# Patient Record
Sex: MALE | Race: WHITE | NOT HISPANIC OR LATINO | Employment: FULL TIME | URBAN - METROPOLITAN AREA
[De-identification: names, ages, dates, MRNs, and addresses within clinical notes are randomized per-mention and may not be internally consistent; named-entity substitution may affect disease eponyms.]

---

## 2018-04-15 ENCOUNTER — OFFICE VISIT (OUTPATIENT)
Dept: URGENT CARE | Facility: CLINIC | Age: 43
End: 2018-04-15
Payer: COMMERCIAL

## 2018-04-15 VITALS
SYSTOLIC BLOOD PRESSURE: 114 MMHG | HEART RATE: 74 BPM | HEIGHT: 72 IN | RESPIRATION RATE: 16 BRPM | TEMPERATURE: 97.2 F | DIASTOLIC BLOOD PRESSURE: 84 MMHG | WEIGHT: 215 LBS | OXYGEN SATURATION: 98 % | BODY MASS INDEX: 29.12 KG/M2

## 2018-04-15 DIAGNOSIS — J20.9 ACUTE BRONCHITIS, UNSPECIFIED ORGANISM: Primary | ICD-10-CM

## 2018-04-15 PROCEDURE — G0382 LEV 3 HOSP TYPE B ED VISIT: HCPCS | Performed by: EMERGENCY MEDICINE

## 2018-04-15 RX ORDER — LEVOTHYROXINE SODIUM 0.07 MG/1
1 TABLET ORAL
COMMUNITY
Start: 2012-07-13

## 2018-04-15 RX ORDER — AMOXICILLIN 875 MG/1
875 TABLET, COATED ORAL 2 TIMES DAILY
Qty: 20 TABLET | Refills: 0 | Status: SHIPPED | OUTPATIENT
Start: 2018-04-15 | End: 2018-04-25

## 2018-04-15 NOTE — PROGRESS NOTES
Assessment/Plan: bronchitis    No problem-specific Assessment & Plan notes found for this encounter  Diagnoses and all orders for this visit:    Acute bronchitis, unspecified organism  -     amoxicillin (AMOXIL) 875 mg tablet; Take 1 tablet (875 mg total) by mouth 2 (two) times a day for 10 days    Other orders  -     levothyroxine 75 mcg tablet; Take 1 tablet by mouth          Subjective:      Patient ID: Matthew Fraser is a 38 y o  male  Cold symptoms for 5-6 days; cough, slight fever, some nasal congestion and post-nasal drip      URI    This is a new problem  The current episode started in the past 7 days  The problem has been gradually worsening  The maximum temperature recorded prior to his arrival was 100 4 - 100 9 F  Associated symptoms include congestion and coughing  He has tried nothing for the symptoms  The treatment provided no relief  The following portions of the patient's history were reviewed and updated as appropriate: current medications, past family history, past medical history, past social history, past surgical history and problem list     Review of Systems   Constitutional: Positive for fever (slight)  HENT: Positive for congestion  Respiratory: Positive for cough  All other systems reviewed and are negative  Objective:      /84   Pulse 74   Temp (!) 97 2 °F (36 2 °C)   Resp 16   Ht 6' (1 829 m)   Wt 97 5 kg (215 lb)   SpO2 98%   BMI 29 16 kg/m²          Physical Exam   Constitutional: He appears well-developed and well-nourished  HENT:   Right Ear: External ear normal    Left Ear: External ear normal    Mouth/Throat: Oropharynx is clear and moist    Mild postnasal drip   Eyes: Pupils are equal, round, and reactive to light  Neck: Normal range of motion  Cardiovascular: Normal rate  Pulmonary/Chest: Effort normal and breath sounds normal    Abdominal: Soft  Neurological: He is alert  Skin: Skin is warm and dry     Psychiatric: He has a normal mood and affect   His behavior is normal  Judgment and thought content normal

## 2018-10-06 ENCOUNTER — OFFICE VISIT (OUTPATIENT)
Dept: URGENT CARE | Facility: CLINIC | Age: 43
End: 2018-10-06
Payer: COMMERCIAL

## 2018-10-06 VITALS
HEART RATE: 77 BPM | RESPIRATION RATE: 16 BRPM | OXYGEN SATURATION: 98 % | BODY MASS INDEX: 29.12 KG/M2 | WEIGHT: 215 LBS | HEIGHT: 72 IN | TEMPERATURE: 97.5 F

## 2018-10-06 DIAGNOSIS — L23.9 ALLERGIC CONTACT DERMATITIS, UNSPECIFIED TRIGGER: Primary | ICD-10-CM

## 2018-10-06 PROCEDURE — G0382 LEV 3 HOSP TYPE B ED VISIT: HCPCS

## 2018-10-06 RX ORDER — PREDNISONE 10 MG/1
TABLET ORAL
Qty: 42 TABLET | Refills: 0 | Status: SHIPPED | OUTPATIENT
Start: 2018-10-06 | End: 2021-07-26 | Stop reason: ALTCHOICE

## 2018-10-06 NOTE — PROGRESS NOTES
NAME: Kyaw Clark is a 37 y o  male  : 1975    MRN: [id number]      Assessment and Plan   Allergic contact dermatitis, unspecified trigger [L23 9]  1  Allergic contact dermatitis, unspecified trigger  predniSONE 10 mg tablet       Distribution is consistent with where clothing rubs as it his spare the face and feet and patient reports he does not wear socks  Patient Instructions   Patient Instructions   Start prednisone taper today, discontinue previous one  Take Benadryl, 50 milligrams at night as it will make you drowsy  Discontinue use of fabric softener  Follow-up with PCP/Dermatology in 2-3 days    Proceed to ER if symptoms worsen  History of Present Illness     Patient presents complaining of a 1/2 week history of red itchy rash to most of his body  He reports he was seen in urgent care and they gave him a shot of something" and told him to take Benadryl  He reports it did not get any better so he follow-up another urgent care 2 days ago where they gave him 125 Solu-Medrol and started him on a 10 day, 20 milligram per day dose of prednisone  He reports he continues to have worsening of the rash and continues to itch  They also prescribed betamethasone cream which she has not found any relief with  He has not taken any Benadryl  Denies any wheezing or shortness of breath  He reports he has a strong sensitivity to poison ivy gets it every year and states that this looks like it  He reports that his wife started a new fabric softener just around the time of this rash appeared but states that the fabric softener says hypoallergenic  Review of Systems   Review of Systems   Constitutional: Negative for chills and fever  Respiratory: Negative for cough, chest tightness, shortness of breath, wheezing and stridor  Skin: Positive for rash           Current Medications       Current Outpatient Prescriptions:     levothyroxine 75 mcg tablet, Take 1 tablet by mouth, Disp: , Rfl:    predniSONE 10 mg tablet, Take 6 pills day 1 and 2, 5 pills day 3 and 4, 4 pills day 5 and 6, 3 pills day 7 and 8, 2 pills day 9 in 10 and 1 pills days 11 and 12, Disp: 42 tablet, Rfl: 0    Current Allergies     Allergies as of 10/06/2018    (No Known Allergies)              No past medical history on file  No past surgical history on file  No family history on file  Medications have been verified  The following portions of the patient's history were reviewed and updated as appropriate: allergies, current medications, past family history, past medical history, past social history, past surgical history and problem list     Objective   Pulse 77   Temp 97 5 °F (36 4 °C)   Resp 16   Ht 6' (1 829 m)   Wt 97 5 kg (215 lb)   SpO2 98%   BMI 29 16 kg/m²      Physical Exam     Physical Exam   Constitutional: He appears well-developed and well-nourished  No distress  Skin:        Areas of erythematous macular papular blanching rash which coalesce into plaques  Concentrated groupings around the neckline, in her arms and axilla, bottom of the abdomen, inner thighs and posterior thighs, shoulders, upper thoracic area, and lateral aspects of lumbar spine  More diffusely distributed on the thorax, distal arms, lower legs

## 2018-10-06 NOTE — PATIENT INSTRUCTIONS
Start prednisone taper today, discontinue previous one  Take Benadryl, 50 milligrams at night as it will make you drowsy  Discontinue use of fabric softener  Follow-up with PCP/Dermatology in 2-3 days

## 2019-12-01 ENCOUNTER — OFFICE VISIT (OUTPATIENT)
Dept: URGENT CARE | Facility: CLINIC | Age: 44
End: 2019-12-01
Payer: COMMERCIAL

## 2019-12-01 VITALS
BODY MASS INDEX: 28.36 KG/M2 | TEMPERATURE: 99.1 F | HEIGHT: 73 IN | DIASTOLIC BLOOD PRESSURE: 108 MMHG | OXYGEN SATURATION: 96 % | HEART RATE: 98 BPM | RESPIRATION RATE: 16 BRPM | WEIGHT: 214 LBS | SYSTOLIC BLOOD PRESSURE: 152 MMHG

## 2019-12-01 DIAGNOSIS — J01.00 ACUTE NON-RECURRENT MAXILLARY SINUSITIS: Primary | ICD-10-CM

## 2019-12-01 DIAGNOSIS — R05.9 COUGH: ICD-10-CM

## 2019-12-01 PROCEDURE — 99213 OFFICE O/P EST LOW 20 MIN: CPT | Performed by: NURSE PRACTITIONER

## 2019-12-01 RX ORDER — BENZONATATE 200 MG/1
200 CAPSULE ORAL 3 TIMES DAILY PRN
Qty: 20 CAPSULE | Refills: 0 | Status: SHIPPED | OUTPATIENT
Start: 2019-12-01 | End: 2021-07-26 | Stop reason: ALTCHOICE

## 2019-12-01 RX ORDER — AMOXICILLIN AND CLAVULANATE POTASSIUM 875; 125 MG/1; MG/1
1 TABLET, FILM COATED ORAL EVERY 12 HOURS SCHEDULED
Qty: 14 TABLET | Refills: 0 | Status: SHIPPED | OUTPATIENT
Start: 2019-12-01 | End: 2019-12-08

## 2019-12-01 NOTE — PROGRESS NOTES
[facility]        NAME: Thelma Moss is a 53 y o  male  : 1975    MRN: [id number]  DATE: 2019  TIME: 8:52 AM    Assessment and Plan   Acute non-recurrent maxillary sinusitis [J01 00]  1  Acute non-recurrent maxillary sinusitis  amoxicillin-clavulanate (AUGMENTIN) 875-125 mg per tablet   2  Cough  benzonatate (TESSALON) 200 MG capsule         Patient Instructions     Take meds as directed  micunex BID OTC  Follow up with PCP in 3-5 days  Proceed to  ER if symptoms worsen  Chief Complaint     Chief Complaint   Patient presents with    Sinusitis     Sinus congestion, green nasal drainage, chills,  sore throat, cough with green phlegm         History of Present Illness       HPI   Reports sinus congestion with cough and green mucus, getting worse  Duration 6 days  Started having chills this morning  Headaches about 3 days  Review of Systems   Review of Systems   Constitutional: Positive for chills  HENT: Positive for congestion, postnasal drip, rhinorrhea and sinus pain  Negative for sore throat  Respiratory: Positive for cough  Negative for wheezing  Cardiovascular: Negative for chest pain  Gastrointestinal: Negative for nausea and vomiting           Current Medications       Current Outpatient Medications:     levothyroxine 75 mcg tablet, Take 1 tablet by mouth, Disp: , Rfl:     amoxicillin-clavulanate (AUGMENTIN) 875-125 mg per tablet, Take 1 tablet by mouth every 12 (twelve) hours for 7 days, Disp: 14 tablet, Rfl: 0    benzonatate (TESSALON) 200 MG capsule, Take 1 capsule (200 mg total) by mouth 3 (three) times a day as needed for cough, Disp: 20 capsule, Rfl: 0    predniSONE 10 mg tablet, Take 6 pills day 1 and 2, 5 pills day 3 and 4, 4 pills day 5 and 6, 3 pills day 7 and 8, 2 pills day 9 in 10 and 1 pills days 11 and 12 (Patient not taking: Reported on 2019), Disp: 42 tablet, Rfl: 0    Current Allergies     Allergies as of 2019    (No Known Allergies)            The following portions of the patient's history were reviewed and updated as appropriate: allergies, current medications, past family history, past medical history, past social history, past surgical history and problem list      No past medical history on file  No past surgical history on file  No family history on file  Medications have been verified  Objective   BP (!) 152/108   Pulse 98   Temp 99 1 °F (37 3 °C)   Resp 16   Ht 6' 1" (1 854 m)   Wt 97 1 kg (214 lb)   SpO2 96%   BMI 28 23 kg/m²        Physical Exam     Physical Exam   HENT:   Right Ear: External ear normal    Left Ear: External ear normal    Post nasal drip  Turbinates 3+  Moderate nasal discharge  Some discomfort with palpation of the sinuses   Cardiovascular: Normal rate and regular rhythm  Pulmonary/Chest: Effort normal  He has no wheezes  He exhibits no tenderness  Some congestion in the lungs   Lymphadenopathy:     He has no cervical adenopathy

## 2019-12-01 NOTE — PATIENT INSTRUCTIONS

## 2020-02-03 ENCOUNTER — APPOINTMENT (OUTPATIENT)
Dept: RADIOLOGY | Facility: CLINIC | Age: 45
End: 2020-02-03
Payer: COMMERCIAL

## 2020-02-03 ENCOUNTER — OFFICE VISIT (OUTPATIENT)
Dept: URGENT CARE | Facility: CLINIC | Age: 45
End: 2020-02-03
Payer: COMMERCIAL

## 2020-02-03 VITALS
DIASTOLIC BLOOD PRESSURE: 78 MMHG | OXYGEN SATURATION: 98 % | BODY MASS INDEX: 29.16 KG/M2 | SYSTOLIC BLOOD PRESSURE: 112 MMHG | HEIGHT: 73 IN | WEIGHT: 220 LBS | RESPIRATION RATE: 18 BRPM | HEART RATE: 98 BPM | TEMPERATURE: 97.5 F

## 2020-02-03 DIAGNOSIS — S39.012A STRAIN OF MUSCLE, FASCIA AND TENDON OF LOWER BACK, INITIAL ENCOUNTER: ICD-10-CM

## 2020-02-03 DIAGNOSIS — R10.9 FLANK PAIN: Primary | ICD-10-CM

## 2020-02-03 DIAGNOSIS — R10.9 FLANK PAIN: ICD-10-CM

## 2020-02-03 LAB
SL AMB  POCT GLUCOSE, UA: NORMAL
SL AMB LEUKOCYTE ESTERASE,UA: NORMAL
SL AMB POCT BILIRUBIN,UA: NORMAL
SL AMB POCT BLOOD,UA: NORMAL
SL AMB POCT CLARITY,UA: CLEAR
SL AMB POCT COLOR,UA: YELLOW
SL AMB POCT KETONES,UA: NORMAL
SL AMB POCT NITRITE,UA: NORMAL
SL AMB POCT PH,UA: 5
SL AMB POCT SPECIFIC GRAVITY,UA: 1.02
SL AMB POCT URINE PROTEIN: NORMAL
SL AMB POCT UROBILINOGEN: 0.2

## 2020-02-03 PROCEDURE — 71046 X-RAY EXAM CHEST 2 VIEWS: CPT

## 2020-02-03 PROCEDURE — S9083 URGENT CARE CENTER GLOBAL: HCPCS | Performed by: NURSE PRACTITIONER

## 2020-02-03 PROCEDURE — G0382 LEV 3 HOSP TYPE B ED VISIT: HCPCS | Performed by: NURSE PRACTITIONER

## 2020-02-03 RX ORDER — CYCLOBENZAPRINE HCL 10 MG
10 TABLET ORAL
Qty: 10 TABLET | Refills: 0 | Status: SHIPPED | OUTPATIENT
Start: 2020-02-03 | End: 2020-02-13

## 2020-02-04 NOTE — PROGRESS NOTES
Assessment/Plan    Flank pain [R10 9]  1  Flank pain  POCT urine dip auto non-scope    XR chest pa & lateral   2  Strain of muscle, fascia and tendon of lower back, initial encounter  cyclobenzaprine (FLEXERIL) 10 mg tablet         Subjective:     Patient ID: Mirlande Villafuerte is a 53 y o  male  Reason For Visit / Chief Complaint  Chief Complaint   Patient presents with    Flank Pain     left lateral/left subcostal pain that began Weds or Thurs  Pain is a constant 4, increases to a 7 on deep inspiration  This is a 52-year-old male patient who presents to the urgent care today  Patient is complaining of left sided pain for approximately 5 days  Patient states that the pain is always there and is worse with a deep breath  Patient denies injury or overuse  Patient denies chest pain or shortness of breath  Patient denies issues with urination or defecation  Patient is otherwise healthy  Patient denies strenuous exercise although he does state that he has active  Patient denies fever or chills  No past medical history on file  No past surgical history on file  No family history on file  Review of Systems   Constitutional: Negative for chills, diaphoresis and fever  HENT: Negative for congestion and sore throat  Respiratory: Negative for apnea, cough, choking, chest tightness, shortness of breath, wheezing and stridor  Cardiovascular: Negative for chest pain, palpitations and leg swelling  Gastrointestinal: Negative for abdominal pain, constipation, diarrhea, nausea and vomiting  Genitourinary: Positive for flank pain  Musculoskeletal: Negative for back pain and neck pain  Skin: Negative for color change, pallor, rash and wound  Neurological: Negative for dizziness, light-headedness and headaches         Objective:    /78   Pulse 98   Temp 97 5 °F (36 4 °C)   Resp 18   Ht 6' 1" (1 854 m)   Wt 99 8 kg (220 lb)   SpO2 98%   BMI 29 03 kg/m²     Physical Exam Constitutional: He is oriented to person, place, and time  Vital signs are normal  He appears well-developed and well-nourished  He does not appear ill  HENT:   Head: Normocephalic and atraumatic  Right Ear: Hearing, tympanic membrane, external ear and ear canal normal    Left Ear: Hearing, tympanic membrane, external ear and ear canal normal    Nose: Nose normal    Mouth/Throat: Uvula is midline and oropharynx is clear and moist    Neck: Normal range of motion  Neck supple  Cardiovascular: Normal rate, regular rhythm, S1 normal, S2 normal and normal heart sounds  Exam reveals no gallop and no friction rub  No murmur heard  Pulmonary/Chest: Effort normal and breath sounds normal  No stridor  No respiratory distress  He has no decreased breath sounds  He has no wheezes  He has no rhonchi  He has no rales  He exhibits no tenderness  Abdominal: Soft  He exhibits no distension  There is no tenderness  There is no rigidity, no guarding and no CVA tenderness  Musculoskeletal: Normal range of motion  He exhibits tenderness  Lymphadenopathy:     He has no cervical adenopathy  Neurological: He is alert and oriented to person, place, and time  Skin: Skin is warm and dry  Capillary refill takes less than 2 seconds  Nursing note and vitals reviewed      CXR: cardiomegaly

## 2020-02-04 NOTE — PATIENT INSTRUCTIONS
We saw you today for left side pain  As we discussed, we are not finding a emergent cause for your discomfort  Try taking the muscle relaxant to help with sleep  If you're not improved tomorrow, follow up with your family doctor

## 2020-12-24 ENCOUNTER — TELEPHONE (OUTPATIENT)
Dept: LAB | Facility: HOSPITAL | Age: 45
End: 2020-12-24

## 2020-12-28 ENCOUNTER — TRANSCRIBE ORDERS (OUTPATIENT)
Dept: LAB | Facility: HOSPITAL | Age: 45
End: 2020-12-28

## 2020-12-28 ENCOUNTER — APPOINTMENT (OUTPATIENT)
Dept: LAB | Facility: HOSPITAL | Age: 45
End: 2020-12-28

## 2020-12-28 DIAGNOSIS — U07.1 LAB TEST POSITIVE FOR DETECTION OF COVID-19 VIRUS: Primary | ICD-10-CM

## 2020-12-28 DIAGNOSIS — U07.1 LAB TEST POSITIVE FOR DETECTION OF COVID-19 VIRUS: ICD-10-CM

## 2020-12-28 LAB
ALBUMIN SERPL BCP-MCNC: 3.2 G/DL (ref 3.5–5)
ALP SERPL-CCNC: 82 U/L (ref 46–116)
ALT SERPL W P-5'-P-CCNC: 44 U/L (ref 12–78)
ANION GAP SERPL CALCULATED.3IONS-SCNC: 4 MMOL/L (ref 4–13)
AST SERPL W P-5'-P-CCNC: 19 U/L (ref 5–45)
BASOPHILS # BLD MANUAL: 0.15 THOUSAND/UL (ref 0–0.1)
BASOPHILS NFR MAR MANUAL: 2 % (ref 0–1)
BILIRUB SERPL-MCNC: 0.42 MG/DL (ref 0.2–1)
BUN SERPL-MCNC: 17 MG/DL (ref 5–25)
CALCIUM ALBUM COR SERPL-MCNC: 9.3 MG/DL (ref 8.3–10.1)
CALCIUM SERPL-MCNC: 8.7 MG/DL (ref 8.3–10.1)
CHLORIDE SERPL-SCNC: 108 MMOL/L (ref 100–108)
CO2 SERPL-SCNC: 30 MMOL/L (ref 21–32)
CREAT SERPL-MCNC: 0.93 MG/DL (ref 0.6–1.3)
CRP SERPL HS-MCNC: 2.54 MG/L
EOSINOPHIL # BLD MANUAL: 0.15 THOUSAND/UL (ref 0–0.4)
EOSINOPHIL NFR BLD MANUAL: 2 % (ref 0–6)
ERYTHROCYTE [DISTWIDTH] IN BLOOD BY AUTOMATED COUNT: 12.8 % (ref 11.6–15.1)
FERRITIN SERPL-MCNC: 124 NG/ML (ref 8–388)
GFR SERPL CREATININE-BSD FRML MDRD: 97 ML/MIN/1.73SQ M
GLUCOSE SERPL-MCNC: 77 MG/DL (ref 65–140)
HCT VFR BLD AUTO: 47.2 % (ref 36.5–49.3)
HGB BLD-MCNC: 15.5 G/DL (ref 12–17)
LYMPHOCYTES # BLD AUTO: 1.16 THOUSAND/UL (ref 0.6–4.47)
LYMPHOCYTES # BLD AUTO: 16 % (ref 14–44)
MCH RBC QN AUTO: 28.3 PG (ref 26.8–34.3)
MCHC RBC AUTO-ENTMCNC: 32.8 G/DL (ref 31.4–37.4)
MCV RBC AUTO: 86 FL (ref 82–98)
MONOCYTES # BLD AUTO: 0.58 THOUSAND/UL (ref 0–1.22)
MONOCYTES NFR BLD: 8 % (ref 4–12)
NEUTROPHILS # BLD MANUAL: 3.92 THOUSAND/UL (ref 1.85–7.62)
NEUTS SEG NFR BLD AUTO: 54 % (ref 43–75)
NRBC BLD AUTO-RTO: 0 /100 WBCS
PLATELET # BLD AUTO: 225 THOUSANDS/UL (ref 149–390)
PLATELET BLD QL SMEAR: ADEQUATE
PMV BLD AUTO: 9.3 FL (ref 8.9–12.7)
POLYCHROMASIA BLD QL SMEAR: PRESENT
POTASSIUM SERPL-SCNC: 4.1 MMOL/L (ref 3.5–5.3)
PROT SERPL-MCNC: 7.1 G/DL (ref 6.4–8.2)
RBC # BLD AUTO: 5.48 MILLION/UL (ref 3.88–5.62)
RBC MORPH BLD: PRESENT
SODIUM SERPL-SCNC: 142 MMOL/L (ref 136–145)
VARIANT LYMPHS # BLD AUTO: 18 %
WBC # BLD AUTO: 7.26 THOUSAND/UL (ref 4.31–10.16)

## 2020-12-28 PROCEDURE — 86141 C-REACTIVE PROTEIN HS: CPT

## 2020-12-28 PROCEDURE — 82728 ASSAY OF FERRITIN: CPT

## 2020-12-28 PROCEDURE — 80053 COMPREHEN METABOLIC PANEL: CPT

## 2020-12-28 PROCEDURE — 85027 COMPLETE CBC AUTOMATED: CPT

## 2020-12-28 PROCEDURE — 36415 COLL VENOUS BLD VENIPUNCTURE: CPT

## 2020-12-28 PROCEDURE — 85007 BL SMEAR W/DIFF WBC COUNT: CPT

## 2021-07-26 ENCOUNTER — OFFICE VISIT (OUTPATIENT)
Dept: URGENT CARE | Facility: CLINIC | Age: 46
End: 2021-07-26
Payer: COMMERCIAL

## 2021-07-26 VITALS
RESPIRATION RATE: 14 BRPM | WEIGHT: 226 LBS | TEMPERATURE: 96.6 F | HEART RATE: 64 BPM | OXYGEN SATURATION: 98 % | DIASTOLIC BLOOD PRESSURE: 87 MMHG | SYSTOLIC BLOOD PRESSURE: 139 MMHG | BODY MASS INDEX: 29.95 KG/M2 | HEIGHT: 73 IN

## 2021-07-26 DIAGNOSIS — H69.81 ACUTE DYSFUNCTION OF EUSTACHIAN TUBE, RIGHT: Primary | ICD-10-CM

## 2021-07-26 PROCEDURE — 99213 OFFICE O/P EST LOW 20 MIN: CPT | Performed by: PHYSICIAN ASSISTANT

## 2021-07-26 RX ORDER — METHYLPREDNISOLONE 4 MG/1
TABLET ORAL
Qty: 21 EACH | Refills: 0 | Status: SHIPPED | OUTPATIENT
Start: 2021-07-26 | End: 2021-08-01

## 2021-07-26 RX ORDER — FLUTICASONE PROPIONATE 50 MCG
2 SPRAY, SUSPENSION (ML) NASAL DAILY
Qty: 16 G | Refills: 0 | Status: SHIPPED | OUTPATIENT
Start: 2021-07-26

## 2021-07-26 NOTE — PROGRESS NOTES
[facility]        NAME: Lore Khan is a 50 y o  male  : 1975    MRN: [id number]  DATE:   2021  TIME: 3:38 PM    Assessment and Plan   Acute dysfunction of Eustachian tube, right [H69 81]  1  Acute dysfunction of Eustachian tube, right  fluticasone (FLONASE) 50 mcg/act nasal spray    methylPREDNISolone 4 MG tablet therapy pack         Patient Instructions      discussed condition with patient  I suspect that his right ear symptoms are due to a eustachian tube dysfunction  I prescribed him combination of intranasal and oral steroids and recommended over-the-counter decongestants  Should be re-evaluated if condition does not significantly improve over the next week  Follow up with PCP in 3-5 days  Proceed to  ER if symptoms worsen  Chief Complaint     Chief Complaint   Patient presents with    Earache     Right x 2 wks         History of Present Illness         Patient presents with 2 week history of acute right ear pain  Denies otorrhea, tinnitus, vertigo, hearing loss, or any other significant symptoms  Denies any symptoms in the left ear  Review of Systems   Review of Systems   Constitutional: Negative  HENT: Positive for ear pain (  Right)  Negative for ear discharge, hearing loss and tinnitus  Respiratory: Negative  Cardiovascular: Negative  Gastrointestinal: Negative  Genitourinary: Negative  Neurological: Negative for dizziness           Current Medications       Current Outpatient Medications:     cyclobenzaprine (FLEXERIL) 10 mg tablet, Take 1 tablet (10 mg total) by mouth daily at bedtime for 10 days, Disp: 10 tablet, Rfl: 0    fluticasone (FLONASE) 50 mcg/act nasal spray, 2 sprays into each nostril daily, Disp: 16 g, Rfl: 0    levothyroxine 75 mcg tablet, Take 1 tablet by mouth, Disp: , Rfl:     Current Allergies     Allergies as of 2021    (No Known Allergies)            The following portions of the patient's history were reviewed and updated as appropriate: allergies, current medications, past family history, past medical history, past social history, past surgical history and problem list      History reviewed  No pertinent past medical history  History reviewed  No pertinent surgical history  History reviewed  No pertinent family history  Medications have been verified  Objective   /87   Pulse 64   Temp (!) 96 6 °F (35 9 °C)   Resp 14   Ht 6' 1" (1 854 m)   Wt 103 kg (226 lb)   SpO2 98%   BMI 29 82 kg/m²   No LMP for male patient  Physical Exam     Physical Exam  Vitals reviewed  Constitutional:       General: He is not in acute distress  Appearance: He is well-developed  HENT:      Right Ear: Hearing, ear canal and external ear normal       Left Ear: Hearing, tympanic membrane, ear canal and external ear normal       Ears:      Comments:  Right TM dull     Nose: Nose normal       Mouth/Throat:      Mouth: Mucous membranes are moist       Pharynx: Oropharynx is clear  Musculoskeletal:      Cervical back: Neck supple  Lymphadenopathy:      Cervical: No cervical adenopathy  Neurological:      Mental Status: He is alert and oriented to person, place, and time

## 2021-07-26 NOTE — PATIENT INSTRUCTIONS
Eustachian Tube Dysfunction   AMBULATORY CARE:   Eustachian tube dysfunction (ETD)  is a condition that prevents your eustachian tubes from opening properly  It can also cause them to become blocked  Eustachian tubes connect your middle ear to the back of your nose and throat  These tubes open and allow air to flow in and out when you sneeze, swallow, or yawn  Common signs and symptoms include the following:   · Fullness or pressure in your ears    · Muffled hearing, or a feeling you are hearing under water or have clogged ears    · Pain in one or both ears    · Ringing in your ears    · Popping, crackling, or clicking feeling in your ears    · Trouble keeping your balance    Call your doctor or otolaryngologist if:   · Your symptoms do not improve or get worse  · You have a fever  · You have any hearing loss  · You have questions or concerns about your condition or care  Treatment:  ETD may get better on its own without any treatment  If it continues, you may need any of the following:  · Swallow, yawn, or chew gum  to help open your eustachian tubes  Your healthcare provider may also recommend you blow with your mouth shut and your nostrils pinched closed  · Air pressure devices  push air into your nose and eustachian tubes to help relieve air pressure in your ear  · Treatment for allergies  such as decongestants, antihistamines, and nasal steroids may improve ETD  They may help decrease swelling of the eustachian tubes  · A myringotomy  is surgery to make a hole in your eardrum  The hole relieves pressure and lets fluid drain from your ear  A pressure equalizing (PE) tube may be used to keep the hole open and to help drain fluid  · Tuboplasty  is a procedure to widen your eustachian tubes  Follow up with your doctor or otolaryngologist as directed:  Write down your questions so you remember to ask them during your visits    © Copyright [employer] 2021 Information is for End User's use only and may not be sold, redistributed or otherwise used for commercial purposes  All illustrations and images included in CareNotes® are the copyrighted property of [employer]  or [employer]  The above information is an  only  It is not intended as medical advice for individual conditions or treatments  Talk to your doctor, nurse or pharmacist before following any medical regimen to see if it is safe and effective for you

## 2021-10-26 ENCOUNTER — OFFICE VISIT (OUTPATIENT)
Dept: URGENT CARE | Facility: CLINIC | Age: 46
End: 2021-10-26
Payer: COMMERCIAL

## 2021-10-26 VITALS
TEMPERATURE: 97.5 F | WEIGHT: 215 LBS | RESPIRATION RATE: 18 BRPM | HEART RATE: 68 BPM | BODY MASS INDEX: 28.49 KG/M2 | HEIGHT: 73 IN | OXYGEN SATURATION: 98 %

## 2021-10-26 DIAGNOSIS — H92.01 EARACHE ON RIGHT: Primary | ICD-10-CM

## 2021-10-26 PROCEDURE — 99213 OFFICE O/P EST LOW 20 MIN: CPT | Performed by: PHYSICIAN ASSISTANT

## 2021-10-26 RX ORDER — PREDNISONE 10 MG/1
TABLET ORAL
Qty: 21 TABLET | Refills: 0 | Status: SHIPPED | OUTPATIENT
Start: 2021-10-26

## 2021-12-22 PROCEDURE — 88304 TISSUE EXAM BY PATHOLOGIST: CPT | Performed by: PATHOLOGY

## 2021-12-23 ENCOUNTER — LAB REQUISITION (OUTPATIENT)
Dept: LAB | Facility: HOSPITAL | Age: 46
End: 2021-12-23
Payer: COMMERCIAL

## 2021-12-23 DIAGNOSIS — R22.2 LOCALIZED SWELLING, MASS AND LUMP, TRUNK: ICD-10-CM

## 2023-03-13 ENCOUNTER — LAB REQUISITION (OUTPATIENT)
Dept: LAB | Facility: HOSPITAL | Age: 48
End: 2023-03-13

## 2023-03-13 DIAGNOSIS — M79.9 SOFT TISSUE DISORDER, UNSPECIFIED: ICD-10-CM

## 2025-05-07 ENCOUNTER — APPOINTMENT (OUTPATIENT)
Dept: URGENT CARE | Age: 50
End: 2025-05-07